# Patient Record
Sex: FEMALE | Race: WHITE | NOT HISPANIC OR LATINO | Employment: FULL TIME | URBAN - METROPOLITAN AREA
[De-identification: names, ages, dates, MRNs, and addresses within clinical notes are randomized per-mention and may not be internally consistent; named-entity substitution may affect disease eponyms.]

---

## 2024-06-08 PROCEDURE — 99283 EMERGENCY DEPT VISIT LOW MDM: CPT

## 2024-06-09 ENCOUNTER — HOSPITAL ENCOUNTER (EMERGENCY)
Facility: HOSPITAL | Age: 59
Discharge: HOME | End: 2024-06-09
Attending: EMERGENCY MEDICINE | Admitting: EMERGENCY MEDICINE
Payer: COMMERCIAL

## 2024-06-09 VITALS
TEMPERATURE: 98.5 F | WEIGHT: 277.12 LBS | DIASTOLIC BLOOD PRESSURE: 98 MMHG | RESPIRATION RATE: 20 BRPM | SYSTOLIC BLOOD PRESSURE: 209 MMHG | BODY MASS INDEX: 41.04 KG/M2 | OXYGEN SATURATION: 100 % | HEART RATE: 73 BPM | HEIGHT: 69 IN

## 2024-06-09 DIAGNOSIS — I10 HYPERTENSION, UNSPECIFIED TYPE: ICD-10-CM

## 2024-06-09 DIAGNOSIS — K08.89 PAIN, DENTAL: Primary | ICD-10-CM

## 2024-06-09 DIAGNOSIS — K04.7 DENTAL ABSCESS: ICD-10-CM

## 2024-06-09 DIAGNOSIS — R22.0 LEFT FACIAL SWELLING: ICD-10-CM

## 2024-06-09 RX ORDER — PENICILLIN V POTASSIUM 250 MG/1
500 TABLET ORAL ONCE
Status: COMPLETED | OUTPATIENT
Start: 2024-06-09 | End: 2024-06-09

## 2024-06-09 RX ORDER — HYDROCODONE BITARTRATE AND ACETAMINOPHEN 5; 325 MG/1; MG/1
1 TABLET ORAL EVERY 6 HOURS PRN
Qty: 15 TABLET | Refills: 0 | Status: SHIPPED | OUTPATIENT
Start: 2024-06-09

## 2024-06-09 RX ORDER — OXYCODONE HYDROCHLORIDE AND ACETAMINOPHEN 5; 325 MG/1; MG/1
1 TABLET ORAL ONCE
Status: COMPLETED | OUTPATIENT
Start: 2024-06-09 | End: 2024-06-09

## 2024-06-09 RX ADMIN — OXYCODONE AND ACETAMINOPHEN 1 TABLET: 5; 325 TABLET ORAL at 02:53

## 2024-06-09 RX ADMIN — PENICILLIN V POTASSIUM 500 MG: 250 TABLET, FILM COATED ORAL at 02:53

## 2024-06-09 NOTE — ED PROVIDER NOTES
"Patient is 59 y.o. year old female that presents to the ED for evaluation of dental pain.   Physical Exam    ED Course:    BP (!) 209/98 (BP Location: Right arm, Patient Position: Sitting)   Pulse 73   Temp 98.5 °F (36.9 °C) (Oral)   Resp 20   Ht 175.3 cm (69\")   Wt 126 kg (277 lb 1.9 oz)   SpO2 100%   BMI 40.92 kg/m²   No results found for this or any previous visit.  Medications   oxyCODONE-acetaminophen (PERCOCET) 5-325 MG per tablet 1 tablet (1 tablet Oral Given 6/9/24 0253)   penicillin v potassium (VEETID) tablet 500 mg (500 mg Oral Given 6/9/24 0253)     No results found.    MDM:      I have seen and evaluated this patient and agree with the nurse practitioner or physician assistant´s documentation and assessment. All charts, labs, and imaging studies were reviewed. Documentation of one or more elements of my assessment included in the medical record. I agree with findings, exam, and plan.    Disposition:   ED Disposition       ED Disposition   Discharge    Condition   Stable    Comment   --                 Clincal Impression: Dental pain    Catalino Andrew MD  07:12 EDT  06/09/24         Catalino Andrew MD  06/09/24 0712    "

## 2024-06-09 NOTE — ED PROVIDER NOTES
Time: 2:11 AM EDT  Date of encounter:  6/8/2024  Independent Historian/Clinical History and Information was obtained by:   Patient    History is limited by: N/A    Chief Complaint: DENTAL PAIN/LEFT FACIAL SWELLING      History of Present Illness:      The patient presents to the emergency department and states that yesterday about 2 or 3:00 in the afternoon she started having some pain in her lower left jaw.  She states that within just a few hours she noticed she was having some facial swelling on that side.  She states that the pain is increased with the swelling.  She states that about 2 months ago she had a dental abscess for which she was seen for and given antibiotics.  She states that she completed those antibiotics and that it got better.  She states that it was in the same area of her left lower jaw.  She denies any fevers.  She reports no nausea or vomiting.  She can open and close her jaw is needed without difficulties.  She does have a bad tooth that she states she thought is fractured on the lower left jaw.  She denies any foul taste or drainage.      History provided by:  Patient   used: No        Patient Care Team  Primary Care Provider: eFliz Zimmer APRN    Past Medical History:     No Known Allergies  History reviewed. No pertinent past medical history.  History reviewed. No pertinent surgical history.  History reviewed. No pertinent family history.    Home Medications:  Prior to Admission medications    Not on File        Social History:          Review of Systems:  Review of Systems   Constitutional:  Negative for chills and fever.   HENT:  Positive for dental problem and facial swelling. Negative for congestion, ear pain and sore throat.    Eyes:  Negative for pain.   Respiratory:  Negative for cough, chest tightness, shortness of breath and wheezing.    Cardiovascular:  Negative for chest pain.   Gastrointestinal:  Negative for abdominal pain, diarrhea, nausea and  "vomiting.   Genitourinary:  Negative for flank pain and hematuria.   Musculoskeletal:  Negative for back pain, joint swelling, neck pain and neck stiffness.   Skin:  Negative for color change, pallor and rash.   Neurological:  Negative for seizures and headaches.   All other systems reviewed and are negative.       Physical Exam:  BP (!) 209/98 (BP Location: Right arm, Patient Position: Sitting)   Pulse 73   Temp 98.5 °F (36.9 °C) (Oral)   Resp 20   Ht 175.3 cm (69\")   Wt 126 kg (277 lb 1.9 oz)   SpO2 100%   BMI 40.92 kg/m²     Physical Exam  Vitals and nursing note reviewed.   Constitutional:       General: She is not in acute distress.     Appearance: Normal appearance. She is not ill-appearing or toxic-appearing.   HENT:      Head: Normocephalic and atraumatic.      Nose: Nose normal.      Mouth/Throat:      Mouth: Mucous membranes are moist.   Eyes:      General: No scleral icterus.     Conjunctiva/sclera: Conjunctivae normal.      Pupils: Pupils are equal, round, and reactive to light.   Cardiovascular:      Rate and Rhythm: Normal rate and regular rhythm.      Pulses: Normal pulses.   Pulmonary:      Effort: Pulmonary effort is normal. No respiratory distress.   Musculoskeletal:         General: Normal range of motion.      Cervical back: Normal range of motion and neck supple. No rigidity or tenderness.   Lymphadenopathy:      Cervical: No cervical adenopathy.   Skin:     General: Skin is warm and dry.      Capillary Refill: Capillary refill takes less than 2 seconds.      Findings: No erythema or rash.   Neurological:      General: No focal deficit present.      Mental Status: She is alert and oriented to person, place, and time. Mental status is at baseline.   Psychiatric:         Mood and Affect: Mood normal.         Behavior: Behavior normal.                Procedures:  Procedures      Medical Decision Making:      Comorbidities that affect care:    Hypertension    External Notes " reviewed:    None      The following orders were placed and all results were independently analyzed by me:  No orders of the defined types were placed in this encounter.      Medications Given in the Emergency Department:  Medications   oxyCODONE-acetaminophen (PERCOCET) 5-325 MG per tablet 1 tablet (has no administration in time range)   penicillin v potassium (VEETID) tablet 500 mg (has no administration in time range)        ED Course:         Labs:    Lab Results (last 24 hours)       ** No results found for the last 24 hours. **             Imaging:    No Radiology Exams Resulted Within Past 24 Hours      Differential Diagnosis and Discussion:    Dental Pain: Differential diagnosis includes but is not limited to dental caries, periodontitis, pericoronitis, peridental abscess, gingival abscess, apthous stomatitis, allergic stomatitis, acute necrotizing ulcerative gingivitis, herpetic stomatitis.  Facial Pain/Swelling: Differential diagnosis includes but is not limited to temporal arteritis, intracranial tumors, neuralgias, dental disease, ocular disease, TMJ syndrome, salivary gland disorders, sinusitis, cluster headaches, migraines, and psychogenic.      MDM  Number of Diagnoses or Management Options  Dental abscess: new and requires workup  Hypertension, unspecified type: established and worsening  Left facial swelling: new and requires workup  Pain, dental: new and requires workup  Risk of Complications, Morbidity, and/or Mortality  Presenting problems: low  Diagnostic procedures: low  Management options: low    Patient Progress  Patient progress: stable         Patient Care Considerations:    LABS: I considered ordering labs, however considering the patient stable condition and complaint I did not feel it was warranted at this time.      Consultants/Shared Management Plan:    None    Social Determinants of Health:    Patient is independent, reliable, and has access to care.       Disposition and Care  Coordination:    Discharged: The patient is suitable and stable for discharge with no need for consideration of admission.    I have explained the patient´s condition, diagnoses and treatment plan based on the information available to me at this time. I have answered questions and addressed any concerns. The patient has a good  understanding of the patient´s diagnosis, condition, and treatment plan as can be expected at this point. The vital signs have been stable. The patient´s condition is stable and appropriate for discharge from the emergency department.      The patient will pursue further outpatient evaluation with the primary care physician or other designated or consulting physician as outlined in the discharge instructions. They are agreeable to this plan of care and follow-up instructions have been explained in detail. The patient has received these instructions in written format and has expressed an understanding of the discharge instructions. The patient is aware that any significant change in condition or worsening of symptoms should prompt an immediate return to this or the closest emergency department or call to 911.  I have explained discharge medications and the need for follow up with the patient/caretakers. This was also printed in the discharge instructions. Patient was discharged with the following medications and follow up:      Medication List        New Prescriptions      HYDROcodone-acetaminophen 5-325 MG per tablet  Commonly known as: NORCO  Take 1 tablet by mouth Every 6 (Six) Hours As Needed for Moderate Pain.               Where to Get Your Medications        These medications [id number] - [place], [place] - [address] - [phone] [facility] [phone] FX  [address] [facility] [address]      Phone: [phone]   HYDROcodone-acetaminophen 5-325 MG per tablet      [facility]  [address] [place] [address]  [facility] [address]  [phone]  Call   FOR FOLLOW UP       Final diagnoses:   Pain, dental   Dental abscess   Left facial swelling   Hypertension, unspecified type        ED Disposition       ED Disposition   Discharge    Condition   Stable    Comment   --               This medical record created using voice recognition software.             Jalyn Ayers, ELVIS  06/09/24 0237

## 2024-06-09 NOTE — DISCHARGE INSTRUCTIONS
Rest, drink plenty of fluids.  Take your meds as prescribed.  Complete the antibiotics.  You may take over-the-counter Motrin or ibuprofen with your medications as needed for pain.  You may also use ice and cool compresses to help with swelling and comfort.  You can use over-the-counter Orajel's with your medications as needed for pain.  Monitor your blood pressure closely and follow-up with ELVIS Gill concerning your high blood pressure readings today.  Follow-up with a dentist of your choice or call [facility] for further evaluation and treatment as.  Follow-up with your primary care provider as needed.  Return to the emergency department for any acutely worsening facial swelling, any fevers of 101 or greater, any airway difficulties or any new or worse concerns.

## 2024-06-09 NOTE — Clinical Note
[facility] EMERGENCY ROOM  [address] [place] [address]  Phone: [phone]  Fax: [phone]    Sandra Douglas was seen and treated in our emergency department on 6/8/2024.  She may return to work on 06/11/2024.         Thank you for choosing [facility].    Jalyn Ayers APRN

## 2025-02-05 ENCOUNTER — HOSPITAL ENCOUNTER (EMERGENCY)
Facility: HOSPITAL | Age: 60
Discharge: HOME | End: 2025-02-05
Attending: EMERGENCY MEDICINE | Admitting: EMERGENCY MEDICINE
Payer: MEDICAID

## 2025-02-05 ENCOUNTER — APPOINTMENT (OUTPATIENT)
Dept: GENERAL RADIOLOGY | Facility: HOSPITAL | Age: 60
End: 2025-02-05
Payer: MEDICAID

## 2025-02-05 VITALS
WEIGHT: 267.2 LBS | HEART RATE: 62 BPM | BODY MASS INDEX: 38.25 KG/M2 | HEIGHT: 70 IN | TEMPERATURE: 97.4 F | DIASTOLIC BLOOD PRESSURE: 70 MMHG | OXYGEN SATURATION: 98 % | SYSTOLIC BLOOD PRESSURE: 159 MMHG | RESPIRATION RATE: 18 BRPM

## 2025-02-05 DIAGNOSIS — E06.3 HASHIMOTO THYROIDITIS: ICD-10-CM

## 2025-02-05 DIAGNOSIS — R42 DIZZINESS: Primary | ICD-10-CM

## 2025-02-05 LAB
ALBUMIN SERPL-MCNC: 4.2 G/DL (ref 3.5–5.2)
ALBUMIN/GLOB SERPL: 1.1 G/DL
ALP SERPL-CCNC: 88 U/L (ref 39–117)
ALT SERPL W P-5'-P-CCNC: 11 U/L (ref 1–33)
ANION GAP SERPL CALCULATED.3IONS-SCNC: 11.4 MMOL/L (ref 5–15)
AST SERPL-CCNC: 13 U/L (ref 1–32)
BACTERIA UR QL AUTO: NORMAL /HPF
BASOPHILS # BLD AUTO: 0.04 10*3/MM3 (ref 0–0.2)
BASOPHILS NFR BLD AUTO: 0.6 % (ref 0–1.5)
BILIRUB SERPL-MCNC: 0.3 MG/DL (ref 0–1.2)
BILIRUB UR QL STRIP: NEGATIVE
BUN SERPL-MCNC: 11 MG/DL (ref 8–23)
BUN/CREAT SERPL: 14.3 (ref 7–25)
CALCIUM SPEC-SCNC: 9.7 MG/DL (ref 8.6–10.5)
CHLORIDE SERPL-SCNC: 105 MMOL/L (ref 98–107)
CLARITY UR: CLEAR
CO2 SERPL-SCNC: 23.6 MMOL/L (ref 22–29)
COLOR UR: YELLOW
CREAT SERPL-MCNC: 0.77 MG/DL (ref 0.57–1)
DEPRECATED RDW RBC AUTO: 43.9 FL (ref 37–54)
EGFRCR SERPLBLD CKD-EPI 2021: 87.3 ML/MIN/1.73
EOSINOPHIL # BLD AUTO: 0.11 10*3/MM3 (ref 0–0.4)
EOSINOPHIL NFR BLD AUTO: 1.7 % (ref 0.3–6.2)
ERYTHROCYTE [DISTWIDTH] IN BLOOD BY AUTOMATED COUNT: 14.2 % (ref 12.3–15.4)
FLUAV SUBTYP SPEC NAA+PROBE: NOT DETECTED
FLUBV RNA ISLT QL NAA+PROBE: NOT DETECTED
GEN 5 1HR TROPONIN T REFLEX: 9 NG/L
GLOBULIN UR ELPH-MCNC: 3.7 GM/DL
GLUCOSE SERPL-MCNC: 109 MG/DL (ref 65–99)
GLUCOSE UR STRIP-MCNC: NEGATIVE MG/DL
HCT VFR BLD AUTO: 42.1 % (ref 34–46.6)
HGB BLD-MCNC: 13.4 G/DL (ref 12–15.9)
HGB UR QL STRIP.AUTO: NEGATIVE
HOLD SPECIMEN: NORMAL
HOLD SPECIMEN: NORMAL
HYALINE CASTS UR QL AUTO: NORMAL /LPF
IMM GRANULOCYTES # BLD AUTO: 0.01 10*3/MM3 (ref 0–0.05)
IMM GRANULOCYTES NFR BLD AUTO: 0.2 % (ref 0–0.5)
KETONES UR QL STRIP: NEGATIVE
LEUKOCYTE ESTERASE UR QL STRIP.AUTO: ABNORMAL
LYMPHOCYTES # BLD AUTO: 1.04 10*3/MM3 (ref 0.7–3.1)
LYMPHOCYTES NFR BLD AUTO: 16 % (ref 19.6–45.3)
MAGNESIUM SERPL-MCNC: 2.2 MG/DL (ref 1.6–2.4)
MCH RBC QN AUTO: 27.1 PG (ref 26.6–33)
MCHC RBC AUTO-ENTMCNC: 31.8 G/DL (ref 31.5–35.7)
MCV RBC AUTO: 85.1 FL (ref 79–97)
MONOCYTES # BLD AUTO: 0.41 10*3/MM3 (ref 0.1–0.9)
MONOCYTES NFR BLD AUTO: 6.3 % (ref 5–12)
NEUTROPHILS NFR BLD AUTO: 4.87 10*3/MM3 (ref 1.7–7)
NEUTROPHILS NFR BLD AUTO: 75.2 % (ref 42.7–76)
NITRITE UR QL STRIP: NEGATIVE
NRBC BLD AUTO-RTO: 0 /100 WBC (ref 0–0.2)
PH UR STRIP.AUTO: 7.5 [PH] (ref 5–8)
PLATELET # BLD AUTO: 340 10*3/MM3 (ref 140–450)
PMV BLD AUTO: 10.1 FL (ref 6–12)
POTASSIUM SERPL-SCNC: 4.1 MMOL/L (ref 3.5–5.2)
PROT SERPL-MCNC: 7.9 G/DL (ref 6–8.5)
PROT UR QL STRIP: NEGATIVE
RBC # BLD AUTO: 4.95 10*6/MM3 (ref 3.77–5.28)
RBC # UR STRIP: NORMAL /HPF
REF LAB TEST METHOD: NORMAL
RSV RNA NPH QL NAA+NON-PROBE: NOT DETECTED
SARS-COV-2 RNA RESP QL NAA+PROBE: NOT DETECTED
SODIUM SERPL-SCNC: 140 MMOL/L (ref 136–145)
SP GR UR STRIP: 1.01 (ref 1–1.03)
SQUAMOUS #/AREA URNS HPF: NORMAL /HPF
T4 FREE SERPL-MCNC: 1.44 NG/DL (ref 0.92–1.68)
TROPONIN T NUMERIC DELTA: 0 NG/L
TROPONIN T SERPL HS-MCNC: 9 NG/L
TSH SERPL DL<=0.05 MIU/L-ACNC: 1.57 UIU/ML (ref 0.27–4.2)
UROBILINOGEN UR QL STRIP: ABNORMAL
WBC # UR STRIP: NORMAL /HPF
WBC NRBC COR # BLD AUTO: 6.48 10*3/MM3 (ref 3.4–10.8)
WHOLE BLOOD HOLD COAG: NORMAL
WHOLE BLOOD HOLD SPECIMEN: NORMAL

## 2025-02-05 PROCEDURE — 84439 ASSAY OF FREE THYROXINE: CPT

## 2025-02-05 PROCEDURE — 85025 COMPLETE CBC W/AUTO DIFF WBC: CPT

## 2025-02-05 PROCEDURE — 71045 X-RAY EXAM CHEST 1 VIEW: CPT

## 2025-02-05 PROCEDURE — 93005 ELECTROCARDIOGRAM TRACING: CPT | Performed by: EMERGENCY MEDICINE

## 2025-02-05 PROCEDURE — 86376 MICROSOMAL ANTIBODY EACH: CPT

## 2025-02-05 PROCEDURE — 81001 URINALYSIS AUTO W/SCOPE: CPT | Performed by: EMERGENCY MEDICINE

## 2025-02-05 PROCEDURE — 87637 SARSCOV2&INF A&B&RSV AMP PRB: CPT

## 2025-02-05 PROCEDURE — 99284 EMERGENCY DEPT VISIT MOD MDM: CPT

## 2025-02-05 PROCEDURE — 84484 ASSAY OF TROPONIN QUANT: CPT

## 2025-02-05 PROCEDURE — 84484 ASSAY OF TROPONIN QUANT: CPT | Performed by: EMERGENCY MEDICINE

## 2025-02-05 PROCEDURE — 36415 COLL VENOUS BLD VENIPUNCTURE: CPT

## 2025-02-05 PROCEDURE — 83735 ASSAY OF MAGNESIUM: CPT

## 2025-02-05 PROCEDURE — 93005 ELECTROCARDIOGRAM TRACING: CPT

## 2025-02-05 PROCEDURE — 80053 COMPREHEN METABOLIC PANEL: CPT

## 2025-02-05 PROCEDURE — 84443 ASSAY THYROID STIM HORMONE: CPT

## 2025-02-05 RX ORDER — SODIUM CHLORIDE 0.9 % (FLUSH) 0.9 %
10 SYRINGE (ML) INJECTION AS NEEDED
Status: DISCONTINUED | OUTPATIENT
Start: 2025-02-05 | End: 2025-02-05 | Stop reason: HOSPADM

## 2025-02-05 RX ORDER — LEVOTHYROXINE SODIUM 75 MCG
TABLET ORAL
COMMUNITY
Start: 2024-11-25

## 2025-02-05 RX ORDER — PENICILLIN V POTASSIUM 500 MG/1
1 TABLET, FILM COATED ORAL EVERY 12 HOURS SCHEDULED
COMMUNITY
Start: 2025-01-30

## 2025-02-05 NOTE — DISCHARGE INSTRUCTIONS
Your lab work, EKG, Chest x-ray, and exam do not suggest any acute cardiac events at this time. You tested negative to COVID, Flu, and RSV.     Follow up with your PCP for any worsening symptoms.     Return to the Emergency Department if you develop any uncontrollable fever, intractable pain, nausea, vomiting.

## 2025-02-05 NOTE — ED PROVIDER NOTES
Time: 3:19 PM EST  Date of encounter:  2/5/2025  Independent Historian/Clinical History and Information was obtained by:   Patient    History is limited by: N/A    Chief Complaint: Weakness      History of Present Illness:  Patient is a 60 y.o. year old female who presents to the emergency department for evaluation of weakness. States that she woke up today feeling off. She denies any abd pain, nausea, vomiting, fever, cough, congestion. States that when this happened in the past, she had sinusitis. She also feel ear fullness as well. Denies any recent falls or traumas. Denies any syncope. Hx of Hashimoto's thyroiditis, currently taking Synthroid.  Denies any history of COPD or asthma, diabetes.  She is currently taking penicillin for her recent tooth avulsion.    Patient Care Team  Primary Care Provider: Feliz Zimmer APRN    Past Medical History:     No Known Allergies  Past Medical History:   Diagnosis Date    Hashimoto's disease      History reviewed. No pertinent surgical history.  History reviewed. No pertinent family history.    Home Medications:  Prior to Admission medications    Medication Sig Start Date End Date Taking? Authorizing Provider   penicillin v potassium (VEETID) 500 MG tablet Take 1 tablet by mouth Every 12 (Twelve) Hours. 1/30/25  Yes ProviderEwa MD   Synthroid 75 MCG tablet  11/25/24  Yes ProviderEwa MD   HYDROcodone-acetaminophen (NORCO) 5-325 MG per tablet Take 1 tablet by mouth Every 6 (Six) Hours As Needed for Moderate Pain. 6/9/24   Catalino Andrew MD        Social History:   Social History     Tobacco Use    Smoking status: Never    Smokeless tobacco: Never   Substance Use Topics    Alcohol use: Yes    Drug use: Never         Review of Systems:  Review of Systems   Constitutional:  Negative for chills and fever.   HENT:  Negative for ear pain.    Eyes:  Negative for pain.   Respiratory:  Negative for cough and shortness of breath.    Cardiovascular:  Negative for  "chest pain.   Gastrointestinal:  Negative for abdominal pain, diarrhea, nausea and vomiting.   Genitourinary:  Negative for dysuria.   Musculoskeletal:  Negative for arthralgias.   Skin:  Negative for rash.   Neurological:  Positive for dizziness and weakness. Negative for headaches.        Physical Exam:  /70   Pulse 62   Temp 97.4 °F (36.3 °C) (Oral)   Resp 18   Ht 177.8 cm (70\")   Wt 121 kg (267 lb 3.2 oz)   LMP  (LMP Unknown)   SpO2 98%   BMI 38.34 kg/m²     Physical Exam  Vitals and nursing note reviewed.   Constitutional:       General: She is not in acute distress.     Appearance: Normal appearance. She is obese.   HENT:      Head: Normocephalic and atraumatic.      Right Ear: Tympanic membrane normal.      Left Ear: Tympanic membrane normal.      Nose: Nose normal.      Mouth/Throat:      Mouth: Mucous membranes are moist.   Eyes:      Extraocular Movements: Extraocular movements intact.      Conjunctiva/sclera: Conjunctivae normal.      Pupils: Pupils are equal, round, and reactive to light.   Cardiovascular:      Rate and Rhythm: Normal rate and regular rhythm.      Pulses: Normal pulses.      Heart sounds: Normal heart sounds.   Pulmonary:      Effort: Pulmonary effort is normal.      Breath sounds: Normal breath sounds.   Abdominal:      General: Abdomen is flat. There is no distension.      Palpations: Abdomen is soft.   Musculoskeletal:         General: Normal range of motion.      Cervical back: Normal range of motion and neck supple.   Skin:     General: Skin is warm and dry.   Neurological:      General: No focal deficit present.      Mental Status: She is alert and oriented to person, place, and time.   Psychiatric:         Mood and Affect: Mood normal.         Behavior: Behavior normal.                    Medical Decision Making:      Comorbidities that affect care:    Hashimoto's disease, obesity    External Notes reviewed:    None      The following orders were placed and all " results were independently analyzed by me:  Orders Placed This Encounter   Procedures    COVID PRE-OP / PRE-PROCEDURE SCREENING ORDER (NO ISOLATION) - Swab, Nasopharynx    COVID-19, FLU A/B, RSV PCR 1 HR TAT - Swab, Nasopharynx    XR Chest 1 View    [place] Draw    Comprehensive Metabolic Panel    High Sensitivity Troponin T    Magnesium    Urinalysis With Microscopic If Indicated (No Culture) - Urine, Clean Catch    CBC Auto Differential    TSH Rfx On Abnormal To Free T4    High Sensitivity Troponin T 1Hr    Urinalysis, Microscopic Only - Urine, Clean Catch    Thyroid Peroxidase Antibody    T4, Free    T4, Free    Undress & Gown    Continuous Pulse Oximetry    Vital Signs    ECG 12 Lead ED Triage Standing Order; Weak / Dizzy / AMS    CBC & Differential    Green Top (Gel)    Lavender Top    Gold Top - SST    Light Blue Top       Medications Given in the Emergency Department:  Medications - No data to display       ED Course:    ED Course as of 02/06/25 0032   Wed Feb 05, 2025   1524 --- PROVIDER IN TRIAGE NOTE ---    The patient was evaluated by Kt rojo in triage. Orders were placed and the patient is currently awaiting disposition.  [CB]   1631 XR Chest 1 View  Negative [MV]      ED Course User Index  [CB] Kt Leonardo, PA-LAURA  [MV] Rashawn Lui PA       Labs:    Lab Results (last 24 hours)       Procedure Component Value Units Date/Time    CBC & Differential [[id number]]  (Abnormal) Collected: 02/05/25 1357    Specimen: Blood Updated: 02/05/25 1458    Narrative:      The following orders were created for panel order CBC & Differential.  Procedure                               Abnormality         Status                     ---------                               -----------         ------                     CBC Auto Differential[[id number]]        Abnormal            Final result                 Please view results for these tests on the individual orders.    Comprehensive Metabolic Panel [[id number]]   (Abnormal) Collected: 02/05/25 1357    Specimen: Blood Updated: 02/05/25 1523     Glucose 109 mg/dL      BUN 11 mg/dL      Creatinine 0.77 mg/dL      Sodium 140 mmol/L      Potassium 4.1 mmol/L      Chloride 105 mmol/L      CO2 23.6 mmol/L      Calcium 9.7 mg/dL      Total Protein 7.9 g/dL      Albumin 4.2 g/dL      ALT (SGPT) 11 U/L      AST (SGOT) 13 U/L      Alkaline Phosphatase 88 U/L      Total Bilirubin 0.3 mg/dL      Globulin 3.7 gm/dL      A/G Ratio 1.1 g/dL      BUN/Creatinine Ratio 14.3     Anion Gap 11.4 mmol/L      eGFR 87.3 mL/min/1.73     Narrative:      GFR Categories in Chronic Kidney Disease (CKD)      GFR Category          GFR (mL/min/1.73)    Interpretation  G1                     90 or greater         Normal or high (1)  G2                      60-89                Mild decrease (1)  G3a                   45-59                Mild to moderate decrease  G3b                   30-44                Moderate to severe decrease  G4                    15-29                Severe decrease  G5                    14 or less           Kidney failure          (1)In the absence of evidence of kidney disease, neither GFR category G1 or G2 fulfill the criteria for CKD.    eGFR calculation 2021 CKD-EPI creatinine equation, which does not include race as a factor    High Sensitivity Troponin T [[id number]]  (Normal) Collected: 02/05/25 1357    Specimen: Blood Updated: 02/05/25 1523     HS Troponin T 9 ng/L     Narrative:      High Sensitive Troponin T Reference Range:  <14.0 ng/L- Negative Female for AMI  <22.0 ng/L- Negative Male for AMI  >=14 - Abnormal Female indicating possible myocardial injury.  >=22 - Abnormal Male indicating possible myocardial injury.   Clinicians would have to utilize clinical acumen, EKG, Troponin, and serial changes to determine if it is an Acute Myocardial Infarction or myocardial injury due to an underlying chronic condition.         Magnesium [[id number]]  (Normal) Collected:  02/05/25 1357    Specimen: Blood Updated: 02/05/25 1523     Magnesium 2.2 mg/dL     CBC Auto Differential [[id number]]  (Abnormal) Collected: 02/05/25 1357    Specimen: Blood Updated: 02/05/25 1458     WBC 6.48 10*3/mm3      RBC 4.95 10*6/mm3      Hemoglobin 13.4 g/dL      Hematocrit 42.1 %      MCV 85.1 fL      MCH 27.1 pg      MCHC 31.8 g/dL      RDW 14.2 %      RDW-SD 43.9 fl      MPV 10.1 fL      Platelets 340 10*3/mm3      Neutrophil % 75.2 %      Lymphocyte % 16.0 %      Monocyte % 6.3 %      Eosinophil % 1.7 %      Basophil % 0.6 %      Immature Grans % 0.2 %      Neutrophils, Absolute 4.87 10*3/mm3      Lymphocytes, Absolute 1.04 10*3/mm3      Monocytes, Absolute 0.41 10*3/mm3      Eosinophils, Absolute 0.11 10*3/mm3      Basophils, Absolute 0.04 10*3/mm3      Immature Grans, Absolute 0.01 10*3/mm3      nRBC 0.0 /100 WBC     TSH Rfx On Abnormal To Free T4 [[id number]]  (Normal) Collected: 02/05/25 1357    Specimen: Blood Updated: 02/05/25 1550     TSH 1.570 uIU/mL     Thyroid Peroxidase Antibody [[id number]] Collected: 02/05/25 1357    Specimen: Blood Updated: 02/05/25 1651    T4, Free [[id number]]  (Normal) Collected: 02/05/25 1357    Specimen: Blood Updated: 02/05/25 1806     Free T4 1.44 ng/dL     High Sensitivity Troponin T 1Hr [[id number]]  (Normal) Collected: 02/05/25 1608    Specimen: Blood Updated: 02/05/25 1705     HS Troponin T 9 ng/L      Troponin T Numeric Delta 0 ng/L     Narrative:      High Sensitive Troponin T Reference Range:  <14.0 ng/L- Negative Female for AMI  <22.0 ng/L- Negative Male for AMI  >=14 - Abnormal Female indicating possible myocardial injury.  >=22 - Abnormal Male indicating possible myocardial injury.   Clinicians would have to utilize clinical acumen, EKG, Troponin, and serial changes to determine if it is an Acute Myocardial Infarction or myocardial injury due to an underlying chronic condition.         Urinalysis With Microscopic If Indicated (No Culture) - Urine, Clean Catch  [[id number]]  (Abnormal) Collected: 02/05/25 1612    Specimen: Urine, Clean Catch Updated: 02/05/25 1627     Color, UA Yellow     Appearance, UA Clear     pH, UA 7.5     Specific Gravity, UA 1.010     Glucose, UA Negative     Ketones, UA Negative     Bilirubin, UA Negative     Blood, UA Negative     Protein, UA Negative     Leuk Esterase, UA Small (1+)     Nitrite, UA Negative     Urobilinogen, UA 0.2 E.U./dL    COVID PRE-OP / PRE-PROCEDURE SCREENING ORDER (NO ISOLATION) - Swab, Nasopharynx [[id number]]  (Normal) Collected: 02/05/25 1612    Specimen: Swab from Nasopharynx Updated: 02/05/25 1659    Narrative:      The following orders were created for panel order COVID PRE-OP / PRE-PROCEDURE SCREENING ORDER (NO ISOLATION) - Swab, Nasopharynx.  Procedure                               Abnormality         Status                     ---------                               -----------         ------                     COVID-19, FLU A/B, RSV P...[[id number]]  Normal              Final result                 Please view results for these tests on the individual orders.    COVID-19, FLU A/B, RSV PCR 1 HR TAT - Swab, Nasopharynx [[id number]]  (Normal) Collected: 02/05/25 1612    Specimen: Swab from Nasopharynx Updated: 02/05/25 1659     COVID19 Not Detected     Influenza A PCR Not Detected     Influenza B PCR Not Detected     RSV, PCR Not Detected    Narrative:      Fact sheet for providers: https://www.fda.gov/media/496000/download    Fact sheet for patients: https://www.fda.gov/media/538525/download    Test performed by PCR.    Urinalysis, Microscopic Only - Urine, Clean Catch [[id number]] Collected: 02/05/25 1612    Specimen: Urine, Clean Catch Updated: 02/05/25 1627     RBC, UA 0-2 /HPF      WBC, UA 0-2 /HPF      Bacteria, UA None Seen /HPF      Squamous Epithelial Cells, UA 0-2 /HPF      Hyaline Casts, UA 0-2 /LPF      Methodology Automated Microscopy             Imaging:    XR Chest 1 View    Result Date: 2/5/2025  XR CHEST  1 VW Date of Exam: 2/5/2025 1:43 PM EST Indication: Weak/Dizzy/AMS triage protocol Comparison: None available. Findings: The cardiomediastinal silhouette is within normal limits. Pulmonary vascularity appears normal. There is no focal airspace consolidation, pleural effusion, or pneumothorax. There is streaky left basilar atelectasis. There are degenerative changes of the thoracic spine.     Impression: Streaky left basilar atelectasis. Electronically Signed: Jayden Vasquez  2/5/2025 2:02 PM EST  Workstation ID: [id number]       Differential Diagnosis and Discussion:    Dizziness: Based on the patient's history, signs, and symptoms, the diffential diagnosis includes but is not limited to meningitis, stroke, sepsis, subarachnoid hemorrhage, intracranial bleeding, encephalitis, vertigo, electrolyte imbalance, and metabolic disorders.    PROCEDURES:    Labs were collected in the emergency department and all labs were reviewed and interpreted by me.  X-ray were performed in the emergency department and all X-ray impressions were independently interpreted by me.  An EKG was performed and the EKG was interpreted by supervising attending.    ECG 12 Lead ED Triage Standing Order; Weak / Dizzy / AMS   Preliminary Result   HEART RATE=57  bpm   RR Girrjxfa=4840  ms   NE Qiudghqh=128  ms   P Horizontal Axis=-2  deg   P Front Axis=19  deg   QRSD Interval=74  ms   QT Syosllnl=797  ms   ZKrG=585  ms   QRS Axis=5  deg   T Wave Axis=28  deg   - BORDERLINE ECG -   Sinus rhythm   LVH by voltage   Date and Time of Study:2025-02-05 13:31:09          Procedures    MDM     Amount and/or Complexity of Data Reviewed  Clinical lab tests: reviewed  Tests in the radiology section of CPT®: reviewed  Tests in the medicine section of CPT®: reviewed    Risk of Complications, Morbidity, and/or Mortality  Presenting problems: moderate  Diagnostic procedures: low  Management options: low    Patient Progress  Patient progress: stable    Patient  presents to the emergency department for evaluation of weakness. States that she woke up today feeling off. She denies any abd pain, nausea, vomiting, fever, cough, congestion. States that when this happened in the past, she had sinusitis. She also feel ear fullness as well. Denies any recent falls or traumas. Denies any syncope. Hx of Hashimoto's thyroiditis, currently taking Synthroid.  Denies any history of COPD or asthma, diabetes.  She is currently taking penicillin for her recent tooth avulsion.    The patient´s CBC that was reviewed and interpreted by me shows no abnormalities of critical concern. Of note, there is no anemia requiring a blood transfusion and the platelet count is acceptable.  The patient´s CMP that was reviewed and interpretted by me shows no abnormalities of critical concern. Of note, the patient´s sodium and potassium are acceptable. The patient´s liver enzymes are unremarkable. The patient´s renal function (creatinine) is preserved. The patient has a normal anion gap.  Magnesium, troponin, TSH, T4 are all within normal limits.    UA is negative for any bacteria.    COVID, flu, RSV are negative.    XR Chest 1 View   Final Result   Impression:   Streaky left basilar atelectasis.            Electronically Signed: Jayden Vasquez     2/5/2025 2:02 PM EST     Workstation ID: [id number]        Discussed imaging findings with the patient.    Patient is wanting to check her antibodies.  Discussed with the patient that this is a send out and will take a few days for the result to come back.  She understands and will follow on her MyChart.    Discussed the lab work results with the patient today.  Patient will follow-up with her PCP in 3 to 5 days.            Patient Care Considerations:    ANTIBIOTICS: I considered prescribing antibiotics as an outpatient however no bacterial focus of infection was found.      Consultants/Shared Management Plan:    None    Social Determinants of Health:    Patient is  independent, reliable, and has access to care.       Disposition and Care Coordination:    Discharged: The patient is suitable and stable for discharge with no need for consideration of admission.    I have explained the patient´s condition, diagnoses and treatment plan based on the information available to me at this time. I have answered questions and addressed any concerns. The patient has a good  understanding of the patient´s diagnosis, condition, and treatment plan as can be expected at this point. The vital signs have been stable. The patient´s condition is stable and appropriate for discharge from the emergency department.      The patient will pursue further outpatient evaluation with the primary care physician or other designated or consulting physician as outlined in the discharge instructions. They are agreeable to this plan of care and follow-up instructions have been explained in detail. The patient has received these instructions in written format and has expressed an understanding of the discharge instructions. The patient is aware that any significant change in condition or worsening of symptoms should prompt an immediate return to this or the closest emergency department or call to 911.  I have explained discharge medications and the need for follow up with the patient/caretakers. This was also printed in the discharge instructions. Patient was discharged with the following medications and follow up:      Medication List      No changes were made to your prescriptions during this visit.      No follow-up provider specified.     Final diagnoses:   Dizziness   Hashimoto thyroiditis        ED Disposition       ED Disposition   Discharge    Condition   Stable    Comment   --               This medical record created using voice recognition software.             Rashawn Lui PA  02/06/25 0031       Rashawn Lui PA  02/06/25 0032

## 2025-02-06 LAB
QT INTERVAL: 420 MS
QTC INTERVAL: 409 MS
THYROPEROXIDASE AB SERPL-ACNC: 30 IU/ML (ref 0–34)